# Patient Record
Sex: MALE | Race: WHITE | NOT HISPANIC OR LATINO | URBAN - METROPOLITAN AREA
[De-identification: names, ages, dates, MRNs, and addresses within clinical notes are randomized per-mention and may not be internally consistent; named-entity substitution may affect disease eponyms.]

---

## 2024-01-19 ENCOUNTER — OFFICE VISIT (OUTPATIENT)
Dept: OTOLARYNGOLOGY | Facility: CLINIC | Age: 44
End: 2024-01-19
Payer: COMMERCIAL

## 2024-01-19 VITALS — WEIGHT: 175 LBS | BODY MASS INDEX: 25.11 KG/M2

## 2024-01-19 DIAGNOSIS — H61.23 EXCESSIVE CERUMEN IN BOTH EAR CANALS: Primary | ICD-10-CM

## 2024-01-19 PROCEDURE — 99213 OFFICE O/P EST LOW 20 MIN: CPT | Performed by: GENERAL PRACTICE

## 2024-01-19 PROCEDURE — 4004F PT TOBACCO SCREEN RCVD TLK: CPT | Performed by: GENERAL PRACTICE

## 2024-01-19 PROCEDURE — 69210 REMOVE IMPACTED EAR WAX UNI: CPT | Performed by: GENERAL PRACTICE

## 2024-01-19 ASSESSMENT — PATIENT HEALTH QUESTIONNAIRE - PHQ9
2. FEELING DOWN, DEPRESSED OR HOPELESS: NOT AT ALL
1. LITTLE INTEREST OR PLEASURE IN DOING THINGS: NOT AT ALL
SUM OF ALL RESPONSES TO PHQ9 QUESTIONS 1 AND 2: 0

## 2024-01-21 NOTE — PROGRESS NOTES
Otolaryngology - Head and Neck Surgery Outpatient New Patient Visit Note        Assessment/Plan   Problem List Items Addressed This Visit    None  Visit Diagnoses         Codes    Excessive cerumen in both ear canals    -  Primary H61.23              Tolerated debridement well.  No otitis.      Follow up:  -plan for follow up in clinic as needed    All of the above findings, impressions, treatment planning and follow up plans were discussed with the patient who indicated understanding.  the patient was instructed to contact or return to clinic sooner if symptoms/signs persist or worsen despite the above management.      Sudhir Mathis MD  Otolaryngology - Head and Neck Surgery            History Of Present Illness  Slim Nash is a 43 y.o. male presenting for ear clearning.  Mild progressive ear fullness R>L.  No otalgia, otorrhea.     Recall    43yoM presents for evaluation of R ear hearing loss.      reports some longstanding R sided hearing loss since his youth, of uncertain etiology.  denies significant history of recurrent otitis or prior ear surgery.      reports recent worsening of R ear hearing approx 3 weeks ago.   no inciting illness, trauma.   notes some aural fullness but no otalgia, vertigo, otorrhea, facial weakness.   tried using ear candle for cerumen concerns, but no effect.      no left sided hearing loss noted  no audiogram in many years.                   Past Medical History  He has no past medical history on file.    Surgical History  He has no past surgical history on file.     Social History  He reports that he has been smoking cigarettes. He has never used smokeless tobacco. No history on file for alcohol use and drug use.    Family History  No family history on file.     Allergies  Penicillin g    Review of Systems  ROS: Pertinent positives as noted in HPI.    - CONSTITUTIONAL: Does not report weight loss, fever or chills.    - HEENT:   Ear: Does not report tinnitus, vertigo,    , otalgia,  otorrhea  Nose: Does not report congestion, rhinorrhea, epistaxis, decreased smell  Throat: Does not report pain, dysphagia, odynophagia  Larynx: Does not report hoarseness,  difficulty breathing, pain with speaking (odynophonia)  Neck: Does not report new masses, pain, swelling  Face: Does not report sinus pain, pressure, swelling, numbness, weakness     - RESPIRATORY: Does not report SOB or cough.    - CV: Does not report palpitations or chest pain.     - GI: Does not report abdominal pain, nausea, vomiting or diarrhea.    - : Does not report dysuria or urinary frequency.    - MSK: Does not report myalgia or joint pain.    - SKIN: Does not report rash or pruritus.    - NEUROLOGICAL: Does not report headache or syncope.    - PSYCHIATRIC: Does not report recent changes in mood. Does not report anxiety or depression.         Physical Exam:     GENERAL:   Alert & Oriented to person, place and time; Normal affect and appearance. Well developed and well nourished. Conversant & cooperative with examination.     HEAD:   Normocephalic, atraumatic. No sinus tenderness to palpation. Normal parotid bilaterally. Normal facial strength.     NEUROLOGIC:   Cranial nerves II-XII grossly intact, gait WNL. Normal mood and affect.    EYES:   Extraocular movements intact. Pupils equal, round, reactive to light and accommodation. No nystagmus, no ptosis. no scleral injection.    EAR:   Normal auricle. No discomfort or TTP with manipulation.   Handheld otoscopic exam showed normal external auditory canals bilaterally. No purulence or EAC inflammation. Obstructive cerumen b/l remvoed with suction.   Right tympanic membrane clear and mobile without evidence of perforation, retraction or middle ear effusion.   Left tympanic membrane clear and mobile without evidence of perforation, retraction or middle ear effusion.     NOSE:   No external deformity. No external nasal lesions, lacerations, or scars. Nasal tip symmetrical with normal  nasal valves.   Nasal cavity with essentially midline septum, normal mucosa and turbinates. No lesions, masses, purulence or polyps.     OC/OP:   Mucous membranes moist, no masses, lesions or exudates.   Normal tongue, floor of mouth, teeth, gums, lips. Normal posterior pharyngeal wall.    Normal tonsils without erythema, exudate or obvious calculi     NECK:   No neck masses or thyroid enlargement. Trachea midline. No tenderness to palpation    LYMPHATIC:   No cervical lymphadenopathy.     RESPIRATORY:   Symmetric chest elevation & no retractions. No significant hoarseness. No increased work of breathing.    CV:   No clubbing or cyanosis. No obvious edema    Skin:   No facial rashes, vesicles or lesions.     Extremities:   No gross abnormalities      Clinic Procedure    Binocular microscopy exam  Indication: tympanic membrane(s) could not be visualized adequately with handheld otoscopy.   Location:  bilateral ears  Visualization Instrument: A microscope was used to visualize through a speculum placed in the ear canal(s) to visualize the ear canal, tympanic membranes and to assist in assessment and removal of debris.  Findings:  see physical exam documentation  Patient Status: The patient tolerated the procedure well.   Complications: There were no complications.     Information review:  External sources (notes, imaging, lab results) listed below personally reviewed to aid in medical decision making process.  -  -  -

## 2024-03-11 ENCOUNTER — OFFICE VISIT (OUTPATIENT)
Dept: ORTHOPEDIC SURGERY | Facility: HOSPITAL | Age: 44
End: 2024-03-11
Payer: COMMERCIAL

## 2024-03-11 DIAGNOSIS — S46.212A RUPTURE OF LEFT DISTAL BICEPS TENDON, INITIAL ENCOUNTER: ICD-10-CM

## 2024-03-11 PROCEDURE — 4004F PT TOBACCO SCREEN RCVD TLK: CPT | Performed by: ORTHOPAEDIC SURGERY

## 2024-03-11 PROCEDURE — 99212 OFFICE O/P EST SF 10 MIN: CPT | Performed by: ORTHOPAEDIC SURGERY

## 2024-03-11 PROCEDURE — 99202 OFFICE O/P NEW SF 15 MIN: CPT | Performed by: ORTHOPAEDIC SURGERY

## 2024-03-11 NOTE — PROGRESS NOTES
Patient is here for evaluation of his left elbow and biceps tendon.  He injured his left biceps tendon just a few days ago.  It retracted into the arm slightly and has pain right distal to the elbow this is on the left side.    The patient is pleasant and cooperative.  The patient is alert and oriented ×3.  Auditory function is intact.  The patient is a good historian.  The patient is not in acute distress.  Eye exam significant for nonicteric sclera, intact ocular muscle movement.  Breathing is rhythmic symmetric and nonlabored.  Left elbow bruising medially over the antecubital fossa and tenderness and a positive hook test.  Elbow flexion and supination strength is 4/5 with pain.  Left biceps is retracted proximally.  Patient has full range of motion of the shoulder and elbow radial pulse palpable brisk capillary refill light touch sensation intact distally.  No peripheral edema.   strength 5/5.    Left distal biceps rupture    I have discussed with patient options for treatment including surgical nonsurgical management.  I have recommended surgery.  Arrangements have been made for the patient have an MRI scan and follow-up with one of our specialists and upper extremity surgery for consultation and possible surgery.    This was dictated using voice recognition software and not corrected for grammatical or spelling errors.

## 2024-03-18 ENCOUNTER — OFFICE VISIT (OUTPATIENT)
Dept: ORTHOPEDIC SURGERY | Facility: HOSPITAL | Age: 44
End: 2024-03-18
Payer: COMMERCIAL

## 2024-03-18 DIAGNOSIS — S46.212A RUPTURE OF LEFT DISTAL BICEPS TENDON, INITIAL ENCOUNTER: Primary | ICD-10-CM

## 2024-03-18 PROCEDURE — 99214 OFFICE O/P EST MOD 30 MIN: CPT | Performed by: STUDENT IN AN ORGANIZED HEALTH CARE EDUCATION/TRAINING PROGRAM

## 2024-03-18 PROCEDURE — 4004F PT TOBACCO SCREEN RCVD TLK: CPT | Performed by: STUDENT IN AN ORGANIZED HEALTH CARE EDUCATION/TRAINING PROGRAM

## 2024-03-18 ASSESSMENT — PAIN DESCRIPTION - DESCRIPTORS: DESCRIPTORS: ACHING

## 2024-03-18 ASSESSMENT — PAIN - FUNCTIONAL ASSESSMENT: PAIN_FUNCTIONAL_ASSESSMENT: 0-10

## 2024-03-18 ASSESSMENT — PAIN SCALES - GENERAL: PAINLEVEL_OUTOF10: 0 - NO PAIN

## 2024-03-18 NOTE — PROGRESS NOTES
Orthopaedic Surgery  New Patient Clinic Note    Slim Nash [id number] March 18, 2024    Reason for Consult: L distal biceps rupture    HPI: 43 y.o. male presenting with L distal biceps rupture which occurred 2 weeks ago when lifting something heavy. Has had some tendon retraction, pain improving and only present with resisted flexion or supination. Bruising resolved. He is back at the gym doing everything but curls. 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 Has not tried any formal treatment. Was seen by Dr. Rich who recommended MRI and referral to hand surgery.     ROS:  A complete review of systems was conducted, pertinent only to the HPI noted above.     Constitutional: None     Eyes: No additions to above history     Ears, Nose, Throat: No additions to above history     Cardiovascular: No additions to above history     Respiratory: No additions to above history     GI: No additions to above history     : No additions to above history     Skin/Neuro: No additions to above history     Endocrine/Heme/Lymph: No additions to above history     Immunologic: No additions to above history     Psychiatric: No additions to above history     Musculoskeletal: see above    PMH: Denies, No history of DVT.     PSH:  Denies.    SHx: No smoking. No Alcohol, No IVDU    Meds:   None      PHYSICAL EXAM    GEN: AaOx4, NAD  HEENT: normocephalic atraumatic, EOMI, MMM, pupils equal and round  PSYCH: appropriate mood and affect  RESP: nonlabored breathing on RA  CARDIAC: Extremities WWP, RRR to peripheral palpation  NEURO: CN 2-12 grossly intact    RUE:   - Skin well appearing with mild bruising at AC fossa. TTP just proximal to AC fossa with palpable tendon stump and asymmetric biceps contour  - elbow ROM from 0-140 f/e and 80-80 s/p, 4/5 supination and flexion strength compared to contralateral. Positive hook test  -Motor/sensory intact in m/u/r; no loss of sensation in forearm  -hand wwp, brisk cap refill, 2+ radial  pulse    Imaging:  No imaging taken today.     Assessment/Plan:  43 y.o. male presenting with L distal biceps rupture. His symptoms at this point 2 weeks after the injury are pretty minimal with only mild pain, cosmetic deformity, and mild weakness specifically with supination. Long discussion with patient about non-operative vs. Operative treatment. Discussed that surgery would not be for a cosmetic reason. Biggest risks with non-operative management would be up to about 30% loss of strength primarily in supination with some loss of flexion strength as well. He should not expect to have pain with non-operative treatment after the acute phase heals. He would not have any restrictions and could continue to exercise at a regular level with the ability for the surrounding muscles to compensate. With surgery he could expect a better outcome with regards to strength, ~5-10% loss. He would also likely have less fatiguing of the muscle with sustained use after surgery. However, there would be risk of infection, nerve injury (PIN and LABCN), and elbow stiffness. Recovery would be about a 4-6months process as well. We did discuss that if he does choose non-operative treatment but is unsatisfied, repair of a chronic tear would then be a larger undertaking than acute surgery due to scarring and need for a possible graft. After a long discussion on risks and benefits, given that this is his non-dominant hand, he has already recovered a majority of his function, and he is going to [place] in 3 weeks and would not want to deal with the rehab, he has decided to pursue non-operative management. We have advised that he cancel his MRI. He may continue activity unrestricted. He can follow up on an as needed basis going forward.

## 2024-03-20 ENCOUNTER — APPOINTMENT (OUTPATIENT)
Dept: RADIOLOGY | Facility: CLINIC | Age: 44
End: 2024-03-20
Payer: COMMERCIAL

## 2024-03-27 ENCOUNTER — APPOINTMENT (OUTPATIENT)
Dept: RADIOLOGY | Facility: CLINIC | Age: 44
End: 2024-03-27
Payer: COMMERCIAL

## 2024-04-01 ENCOUNTER — APPOINTMENT (OUTPATIENT)
Dept: ORTHOPEDIC SURGERY | Facility: HOSPITAL | Age: 44
End: 2024-04-01
Payer: COMMERCIAL

## 2025-03-05 ENCOUNTER — APPOINTMENT (OUTPATIENT)
Dept: OTOLARYNGOLOGY | Facility: CLINIC | Age: 45
End: 2025-03-05
Payer: COMMERCIAL

## 2025-03-05 VITALS — BODY MASS INDEX: 25.83 KG/M2 | WEIGHT: 180 LBS

## 2025-03-05 DIAGNOSIS — J34.2 DEVIATED SEPTUM: ICD-10-CM

## 2025-03-05 DIAGNOSIS — J31.0 CHRONIC RHINITIS: ICD-10-CM

## 2025-03-05 DIAGNOSIS — R06.83 SNORING: ICD-10-CM

## 2025-03-05 DIAGNOSIS — H61.23 EXCESSIVE CERUMEN IN BOTH EAR CANALS: Primary | ICD-10-CM

## 2025-03-05 PROCEDURE — G8433 SCR FOR DEP NOT CPT DOC RSN: HCPCS | Performed by: GENERAL PRACTICE

## 2025-03-05 PROCEDURE — 99214 OFFICE O/P EST MOD 30 MIN: CPT | Performed by: GENERAL PRACTICE

## 2025-03-05 PROCEDURE — 69210 REMOVE IMPACTED EAR WAX UNI: CPT | Performed by: GENERAL PRACTICE

## 2025-03-07 NOTE — PROGRESS NOTES
Otolaryngology - Head and Neck Surgery Outpatient New Patient Visit Note        Assessment/Plan:   Problem List Items Addressed This Visit    None  Visit Diagnoses         Codes    Excessive cerumen in both ear canals    -  Primary H61.23    Deviated septum     J34.2    Chronic rhinitis     J31.0    Snoring     R06.83              Cerumen obstruction b/l, debrided.  No otitis.    Leftward deviated septum and mild chronic rhinitis contributing to snoring.  Control for rhinitis with saline/flonase, consider future septoturbinoplasty.      Follow up:  -plan for follow up in clinic as needed    All of the above findings, impressions, treatment planning and follow up plans were discussed with the patient who indicated understanding.  the patient was instructed to contact or return to clinic sooner if symptoms/signs persist or worsen despite the above management.      Sudhir Mathis MD  Otolaryngology - Head and Neck Surgery            History Of Present Illness  Slim Nash is a 44 y.o. male presenting for cerumen.       The patient reports a history of ear wax buildup requiring debridement, usually every 6-12mo.    The pt reports gradual return of ear canal fullness and plugged sensation.  Reports some muffled hearing.    Denies otalgia, otorrhea.    Denies recent significant history of otitis media or externa.    Denies prior significant history of otologic surgery or trauma.     Notes some chronic ongiog nasal obstruction/congestion.  No obvious allergic rhinitis/sinusitis  Snoring and possible witnessed apneic pauses.                  Past Medical History  He has no past medical history on file.    Surgical History  He has no past surgical history on file.     Social History  He reports that he has been smoking cigarettes. He has never used smokeless tobacco. No history on file for alcohol use and drug use.    Family History  No family history on file.     Allergies  Penicillin g    Review of Systems  ROS: Pertinent  positives as noted in HPI.    - CONSTITUTIONAL: Does not report weight loss, fever or chills.    - HEENT:   Ear: Does not report tinnitus, vertigo,    , otalgia, otorrhea  Nose: Does not report  ,  , epistaxis, decreased smell  Throat: Does not report pain, dysphagia, odynophagia  Larynx: Does not report hoarseness,  difficulty breathing, pain with speaking (odynophonia)  Neck: Does not report new masses, pain, swelling  Face: Does not report sinus pain, pressure, swelling, numbness, weakness     - RESPIRATORY: Does not report SOB or cough.    - CV: Does not report palpitations or chest pain.     - GI: Does not report abdominal pain, nausea, vomiting or diarrhea.    - : Does not report dysuria or urinary frequency.    - MSK: Does not report myalgia or joint pain.    - SKIN: Does not report rash or pruritus.    - NEUROLOGICAL: Does not report headache or syncope.    - PSYCHIATRIC: Does not report recent changes in mood. Does not report anxiety or depression.         Physical Exam:     GENERAL:   Alert & Oriented to person, place and time; Normal affect and appearance. Well developed and well nourished. Conversant & cooperative with examination.     HEAD:   Normocephalic, atraumatic. No sinus tenderness to palpation. Normal parotid bilaterally. Normal facial strength.     NEUROLOGIC:   Cranial nerves II-XII grossly intact, gait WNL. Normal mood and affect.    EYES:   Extraocular movements intact. Pupils equal, round, reactive to light and accommodation. No nystagmus, no ptosis. no scleral injection.    EAR:   Normal auricle. No discomfort or TTP with manipulation.   Handheld otoscopic exam showed normal external auditory canals bilaterally. No purulence or EAC inflammation. Obstructive cerumen b/l, debrdied with forceps/suction.   Right tympanic membrane clear and mobile without evidence of perforation, retraction or middle ear effusion.   Left tympanic membrane clear and mobile without evidence of perforation,  retraction or middle ear effusion.     NOSE:   No external deformity. No external nasal lesions, lacerations, or scars. Nasal tip symmetrical with normal nasal valves.   Nasal cavity with broad leftward septum, edematous  mucosa and turbinates. No lesions, masses, purulence or polyps.     OC/OP:   Mucous membranes moist, no masses, lesions or exudates.   Normal tongue, floor of mouth, teeth, gums, lips. Normal posterior pharyngeal wall.    Normal tonsils without erythema, exudate or obvious calculi     NECK:   No neck masses or thyroid enlargement. Trachea midline. No tenderness to palpation    LYMPHATIC:   No cervical lymphadenopathy.     RESPIRATORY:   Symmetric chest elevation & no retractions. No significant hoarseness. No increased work of breathing.    CV:   No clubbing or cyanosis. No obvious edema    Skin:   No facial rashes, vesicles or lesions.     Extremities:   No gross abnormalities      Clinic Procedure    Binocular microscopy exam  Indication: tympanic membrane(s) could not be visualized adequately with handheld otoscopy.   Location:  bilateral ears  Visualization Instrument: A microscope was used to visualize through a speculum placed in the ear canal(s) to visualize the ear canal, tympanic membranes and to assist in assessment and removal of debris.  Findings:  see physical exam documentation  Patient Status: The patient tolerated the procedure well.   Complications: There were no complications.     Information review:  External sources (notes, imaging, lab results) listed below personally reviewed to aid in medical decision making process.  -  -  -